# Patient Record
Sex: MALE | Race: BLACK OR AFRICAN AMERICAN | NOT HISPANIC OR LATINO | URBAN - METROPOLITAN AREA
[De-identification: names, ages, dates, MRNs, and addresses within clinical notes are randomized per-mention and may not be internally consistent; named-entity substitution may affect disease eponyms.]

---

## 2017-09-19 ENCOUNTER — EMERGENCY (EMERGENCY)
Facility: HOSPITAL | Age: 42
LOS: 0 days | Discharge: HOME | End: 2017-09-19
Attending: EMERGENCY MEDICINE
Payer: COMMERCIAL

## 2017-09-19 VITALS
DIASTOLIC BLOOD PRESSURE: 72 MMHG | HEART RATE: 71 BPM | HEIGHT: 72 IN | SYSTOLIC BLOOD PRESSURE: 128 MMHG | RESPIRATION RATE: 17 BRPM | OXYGEN SATURATION: 100 % | TEMPERATURE: 98 F | WEIGHT: 279.99 LBS

## 2017-09-19 PROCEDURE — 99053 MED SERV 10PM-8AM 24 HR FAC: CPT

## 2017-09-19 PROCEDURE — 99284 EMERGENCY DEPT VISIT MOD MDM: CPT | Mod: 25

## 2017-09-19 PROCEDURE — 73562 X-RAY EXAM OF KNEE 3: CPT | Mod: 26,LT

## 2017-09-19 PROCEDURE — 73560 X-RAY EXAM OF KNEE 1 OR 2: CPT | Mod: 26,LT

## 2017-09-19 RX ORDER — KETOROLAC TROMETHAMINE 30 MG/ML
60 SYRINGE (ML) INJECTION ONCE
Qty: 0 | Refills: 0 | Status: DISCONTINUED | OUTPATIENT
Start: 2017-09-19 | End: 2017-09-19

## 2017-09-19 RX ORDER — DIAZEPAM 5 MG
1 TABLET ORAL
Qty: 9 | Refills: 0 | OUTPATIENT
Start: 2017-09-19 | End: 2017-09-22

## 2017-09-19 RX ORDER — IBUPROFEN 200 MG
2 TABLET ORAL
Qty: 40 | Refills: 0 | OUTPATIENT
Start: 2017-09-19 | End: 2017-09-24

## 2017-09-19 RX ADMIN — Medication 60 MILLIGRAM(S): at 06:09
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
## 2017-09-19 NOTE — ED ADULT NURSE NOTE - ED STAT RN HANDOFF DETAILS
pt stable , not in any distress, vss, re-eval by md , discharged home , after medicated as per ordered, left ed in a stable condition.

## 2017-09-19 NOTE — ED PROVIDER NOTE - MEDICAL DECISION MAKING DETAILS
Patient received toradol and valium. He currently feels well and is now discharged with care instructions. Patient is to follow up with pmd. Discussed results and outcome of testing with the patient.  Patient advised to please follow up with their primary care doctor within the next 24 hours and return to the Emergency Department for worsening symptoms or any other concerns.  Patient advised that their doctor may call  to follow up on the specific results of the tests performed today in the emergency department.

## 2017-09-20 DIAGNOSIS — M54.2 CERVICALGIA: ICD-10-CM

## 2017-09-20 DIAGNOSIS — Z79.1 LONG TERM (CURRENT) USE OF NON-STEROIDAL ANTI-INFLAMMATORIES (NSAID): ICD-10-CM

## 2017-09-20 DIAGNOSIS — M79.1 MYALGIA: ICD-10-CM

## 2019-03-04 ENCOUNTER — EMERGENCY (EMERGENCY)
Facility: HOSPITAL | Age: 44
LOS: 0 days | Discharge: HOME | End: 2019-03-04
Payer: MEDICAID

## 2019-03-04 VITALS
RESPIRATION RATE: 18 BRPM | OXYGEN SATURATION: 100 % | TEMPERATURE: 99 F | DIASTOLIC BLOOD PRESSURE: 99 MMHG | SYSTOLIC BLOOD PRESSURE: 154 MMHG | HEART RATE: 98 BPM | WEIGHT: 265 LBS

## 2019-03-04 DIAGNOSIS — J02.9 ACUTE PHARYNGITIS, UNSPECIFIED: ICD-10-CM

## 2019-03-04 DIAGNOSIS — J02.0 STREPTOCOCCAL PHARYNGITIS: ICD-10-CM

## 2019-03-04 PROCEDURE — 99283 EMERGENCY DEPT VISIT LOW MDM: CPT

## 2019-03-04 RX ORDER — PENICILLIN V POTASSIUM 250 MG
1 TABLET ORAL
Qty: 28 | Refills: 0 | OUTPATIENT
Start: 2019-03-04 | End: 2019-03-10

## 2019-03-04 RX ORDER — IBUPROFEN 200 MG
1 TABLET ORAL
Qty: 20 | Refills: 0 | OUTPATIENT
Start: 2019-03-04 | End: 2019-03-08

## 2019-03-04 NOTE — ED PROVIDER NOTE - CLINICAL SUMMARY MEDICAL DECISION MAKING FREE TEXT BOX
Patient presents with sore throat, No evidence of PTA,  centor 3, will treat empirically for strep pharyngitis. Recommend outpt f/u, return instructions reviewed

## 2019-03-04 NOTE — ED PROVIDER NOTE - OBJECTIVE STATEMENT
43M here with sore throat x 1 week, pain with swallowing, subjective fever,  + nasal congestion. No chills, no nausea or vomiting. No trouble eating, drinking ,swallowing speaking. No CP or SOB. Did not try anything at home

## 2019-03-04 NOTE — ED PROVIDER NOTE - ENMT, MLM
Airway patent, Nasal mucosa clear. Mouth with normal mucosa. Mild bilateral tonsilar enlargement and erythema with exudate, uvula is midline. + lymphadenopathy

## 2021-03-13 ENCOUNTER — EMERGENCY (EMERGENCY)
Facility: HOSPITAL | Age: 46
LOS: 0 days | Discharge: HOME | End: 2021-03-13
Attending: STUDENT IN AN ORGANIZED HEALTH CARE EDUCATION/TRAINING PROGRAM
Payer: MEDICAID

## 2021-03-13 VITALS
DIASTOLIC BLOOD PRESSURE: 78 MMHG | SYSTOLIC BLOOD PRESSURE: 133 MMHG | RESPIRATION RATE: 17 BRPM | HEART RATE: 86 BPM | TEMPERATURE: 98 F | WEIGHT: 214.95 LBS | OXYGEN SATURATION: 100 % | HEIGHT: 72 IN

## 2021-03-13 DIAGNOSIS — M54.9 DORSALGIA, UNSPECIFIED: ICD-10-CM

## 2021-03-13 DIAGNOSIS — L02.212 CUTANEOUS ABSCESS OF BACK [ANY PART, EXCEPT BUTTOCK]: ICD-10-CM

## 2021-03-13 PROCEDURE — 99283 EMERGENCY DEPT VISIT LOW MDM: CPT

## 2021-03-13 RX ORDER — AZTREONAM 2 G
1 VIAL (EA) INJECTION
Qty: 14 | Refills: 0
Start: 2021-03-13 | End: 2021-03-19

## 2021-03-13 RX ADMIN — Medication 1 TABLET(S): at 10:40

## 2021-03-13 NOTE — ED PROVIDER NOTE - OBJECTIVE STATEMENT
45 year old male presents today c/o tenderness to his left upper back, pt reports having a small swelling the area which he first noticed 1.5 months ago, now for the last two days pt has felt increased discomfort aggravated with movement (-) drainage (-) Fevers or chills

## 2021-03-13 NOTE — ED ADULT NURSE NOTE - CHIEF COMPLAINT QUOTE
44 y/o male with no PMH. Presents to the Ed with c/c of upper left sided back pain due to a 5cm by 4cm raised, red, warm to touch swelling under the skin first noticed 1.5 months ago.

## 2021-03-13 NOTE — ED PROVIDER NOTE - CLINICAL SUMMARY MEDICAL DECISION MAKING FREE TEXT BOX
pt presents today with an abscess to his left upper back, first noticed as a small area 1.5 months ago, now increased in size, tender, mild erythema and warm to touch x 2 days, pt started on bactrim, told to use warm compress to the area 10-15 min at a times 4-5 times a day or more if possible, pt also told not to open the area himself and to allow it to drain itself, if no drainage in 2-2 days and area does come to a head, pt told he should return to the ER for I and D, home care instructions provided

## 2021-03-13 NOTE — ED ADULT NURSE NOTE - OBJECTIVE STATEMENT
pt stated he notice a small bump on his left upper back about a month ago and it has gotten bigger , he has discomfort and tightness when he bend over and when he lifts or extends his left arm, elevation noted to left upper antonieta about [id number] in size and redness

## 2021-03-13 NOTE — ED PROVIDER NOTE - PATIENT PORTAL LINK FT
You can access the FollowMyHealth Patient Portal offered by [facility] by registering at the following website: [email or web address]. By joining [facility]’s FollowMyHealth portal, you will also be able to view your health information using other applications (apps) compatible with our system.

## 2023-12-06 NOTE — ED ADULT NURSE NOTE - ALCOHOL USE HISTORY SINGLE SELECT
BP elevated, cannot safely complete procedure, pt symptomatic , head pounding back pain, pt taken to ED by this RN via wheelchair yes...

## 2023-12-12 NOTE — ED ADULT NURSE NOTE - NSFALLRSKASSESSDT_ED_ALL_ED
-- CST Phone (Incoming) rachelle Bazan (Emergency Contact) [phone]            Alternative phone number: none    Can a detailed message be left? Yes    Message Turnaround:     [place]:    Please give this turnaround time to the caller:   \"This message will be sent to [state Provider's name]. The clinical team will fulfill your request as soon as they review your message.\"  
Pt scheduled 12/13  
13-Mar-2021 10:03